# Patient Record
Sex: FEMALE | Race: WHITE | NOT HISPANIC OR LATINO | URBAN - METROPOLITAN AREA
[De-identification: names, ages, dates, MRNs, and addresses within clinical notes are randomized per-mention and may not be internally consistent; named-entity substitution may affect disease eponyms.]

---

## 2018-10-31 ENCOUNTER — NEW PATIENT (OUTPATIENT)
Dept: URBAN - METROPOLITAN AREA CLINIC 30 | Facility: CLINIC | Age: 83
End: 2018-10-31
Payer: MEDICARE

## 2018-10-31 DIAGNOSIS — H04.123 TEAR FILM INSUFFICIENCY OF BILATERAL LACRIMAL GLANDS: ICD-10-CM

## 2018-10-31 DIAGNOSIS — H35.3131 NONEXUDATIVE MACULAR DEGENERATION, EARLY DRY STAGE, BILATERAL: Primary | ICD-10-CM

## 2018-10-31 PROCEDURE — 92015 DETERMINE REFRACTIVE STATE: CPT | Performed by: OPTOMETRIST

## 2018-10-31 PROCEDURE — 92004 COMPRE OPH EXAM NEW PT 1/>: CPT | Performed by: OPTOMETRIST

## 2018-10-31 PROCEDURE — 92134 CPTRZ OPH DX IMG PST SGM RTA: CPT | Performed by: OPTOMETRIST

## 2018-10-31 ASSESSMENT — INTRAOCULAR PRESSURE
OS: 13
OD: 14

## 2018-10-31 ASSESSMENT — KERATOMETRY
OD: 42.73
OS: 42.87

## 2018-10-31 ASSESSMENT — VISUAL ACUITY
OS: 20/30
OD: 20/25

## 2021-09-02 ENCOUNTER — OFFICE VISIT (OUTPATIENT)
Dept: URBAN - METROPOLITAN AREA CLINIC 30 | Facility: CLINIC | Age: 86
End: 2021-09-02
Payer: MEDICARE

## 2021-09-02 DIAGNOSIS — Z96.1 PRESENCE OF INTRAOCULAR LENS: Primary | ICD-10-CM

## 2021-09-02 PROCEDURE — 99214 OFFICE O/P EST MOD 30 MIN: CPT | Performed by: OPTOMETRIST

## 2021-09-02 PROCEDURE — 92134 CPTRZ OPH DX IMG PST SGM RTA: CPT | Performed by: OPTOMETRIST

## 2021-09-02 ASSESSMENT — VISUAL ACUITY
OD: 20/50
OS: 20/350

## 2021-09-02 ASSESSMENT — KERATOMETRY
OS: 42.74
OD: 42.95

## 2021-09-02 ASSESSMENT — INTRAOCULAR PRESSURE
OS: 14
OD: 13

## 2021-09-02 NOTE — IMPRESSION/PLAN
Impression: Exudative macular degeneration, inactive scar, left eye: H35.7423. Plan: Lost to FU. Wet AMD New onset. + HMG OS. Pt ed. Recommend AREDS 2 and AG. Recommend leafy green vegetables in diet. Will refer to retina team for management.

## 2021-09-02 NOTE — IMPRESSION/PLAN
Impression: Nonexudative macular degeneration, intermediate dry stage, right eye: H35.2892. Plan: see other notes.

## 2021-10-04 ENCOUNTER — OFFICE VISIT (OUTPATIENT)
Dept: URBAN - METROPOLITAN AREA CLINIC 30 | Facility: CLINIC | Age: 86
End: 2021-10-04
Payer: MEDICARE

## 2021-10-04 DIAGNOSIS — H35.3223 EXUDATIVE MACULAR DEGENERATION, INACTIVE SCAR, LEFT EYE: Primary | ICD-10-CM

## 2021-10-04 PROCEDURE — 99204 OFFICE O/P NEW MOD 45 MIN: CPT | Performed by: OPHTHALMOLOGY

## 2021-10-04 PROCEDURE — 92134 CPTRZ OPH DX IMG PST SGM RTA: CPT | Performed by: OPHTHALMOLOGY

## 2021-10-04 PROCEDURE — 67028 INJECTION EYE DRUG: CPT | Performed by: OPHTHALMOLOGY

## 2021-10-04 ASSESSMENT — INTRAOCULAR PRESSURE
OD: 10
OS: 10

## 2021-10-04 NOTE — IMPRESSION/PLAN
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  did NOT NOTICE the drop [facility] .  .  SEVERE OS. Hmg and fluid. Duration is UNK. Chronic? LONG d/w pt and family. Options reviewed. Determined TRIAL of injx and POSSIBLE FUTURE BELVEDERE for IMPLANT OS. TODAY Avastin OS (proc note). Future HRA? (less common HRA) RTC 4-6w ND/inj/pos OCT - plan Avastin OS Examination and imaging confirm retinal changes c/w a form of WET macular degeneration. New Avastin injection given for retinal edema / disease activity. All r/b/a reviewed. Off-label use in eye of FDA-approved drug. Theoretical low (but not zero) risk of adverse ocular event or ATE --understood and accepted. Costs / expense / insurance / risk understood -accepted. FUTURE BELVEDERE options?   AGE?

## 2021-11-01 ENCOUNTER — OFFICE VISIT (OUTPATIENT)
Dept: URBAN - METROPOLITAN AREA CLINIC 30 | Facility: CLINIC | Age: 86
End: 2021-11-01
Payer: MEDICARE

## 2021-11-01 PROCEDURE — 67028 INJECTION EYE DRUG: CPT | Performed by: OPHTHALMOLOGY

## 2021-11-01 PROCEDURE — 92134 CPTRZ OPH DX IMG PST SGM RTA: CPT | Performed by: OPHTHALMOLOGY

## 2021-11-01 ASSESSMENT — INTRAOCULAR PRESSURE
OS: 13
OD: 13

## 2021-11-01 NOTE — IMPRESSION/PLAN
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  did NOT NOTICE the drop in 2000 E [facility] .  .  SEVERE OS. Hmg and fluid. Duration is UNK. Chronic? LONG d/w pt and family. Options reviewed. TODAY Avastin OS (proc note). Future HRA? (less common HRA) RTC 4-6w ND2 inj/pos OCT - plan Avastin OS    TRIAL injx POSSIBLE FUTURE BELVEDERE for IMPLANT OS or commercial SUSVIMO

## 2021-11-01 NOTE — IMPRESSION/PLAN
Impression: DRY AMD Right eye: J16.5673. Plan: RIGHT eye DRY AMD --   DRY Macular Degeneration. AREDS2  reviewed  (PRNutr), diet, fish, Amsler, non-smoking.

## 2021-11-29 ENCOUNTER — OFFICE VISIT (OUTPATIENT)
Dept: URBAN - METROPOLITAN AREA CLINIC 30 | Facility: CLINIC | Age: 86
End: 2021-11-29
Payer: MEDICARE

## 2021-11-29 PROCEDURE — 92134 CPTRZ OPH DX IMG PST SGM RTA: CPT | Performed by: OPHTHALMOLOGY

## 2021-11-29 PROCEDURE — 67028 INJECTION EYE DRUG: CPT | Performed by: OPHTHALMOLOGY

## 2021-11-29 ASSESSMENT — INTRAOCULAR PRESSURE
OS: 9
OD: 9

## 2021-11-29 NOTE — IMPRESSION/PLAN
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  did NOT NOTICE the drop in 2000 E [facility] .  .  SEVERE OS. Hmg and fluid. Duration is UNK. Chronic? LONG d/w pt and family. Options reviewed. TODAY Avastin OS (proc note). Future ND1? (less common HRA - ND2?) 
     RTC 4-6w dil, OCT, eval - h/o Avastin OS    TRIAL injx (ATROPHY LIMITING?) POSSIBLE FUTURE PDS IMPLANT OS w commercial SUSVIMO -- however, Atrophy limiting issue -- D/w'd pt and family.      Not study

## 2022-01-14 ENCOUNTER — OFFICE VISIT (OUTPATIENT)
Dept: URBAN - METROPOLITAN AREA CLINIC 24 | Facility: CLINIC | Age: 87
End: 2022-01-14
Payer: MEDICARE

## 2022-01-14 PROCEDURE — 99214 OFFICE O/P EST MOD 30 MIN: CPT | Performed by: OPHTHALMOLOGY

## 2022-01-14 PROCEDURE — 92134 CPTRZ OPH DX IMG PST SGM RTA: CPT | Performed by: OPHTHALMOLOGY

## 2022-01-14 PROCEDURE — 67028 INJECTION EYE DRUG: CPT | Performed by: OPHTHALMOLOGY

## 2022-01-14 ASSESSMENT — INTRAOCULAR PRESSURE
OD: 17
OS: 16

## 2022-01-14 NOTE — IMPRESSION/PLAN
Impression: WET AMD OS CNVM 
 NEW Avastin Infr. OS x Oct '21 Plan: hx: [[LOST to f/u. . . Did NOT NOTICE the drop in [place] w Hmg/CME. UNK duration - chronic? LONG d/w pt and family. Options reviewed]]. TODAY Avastin OS (proc note). Future ND2? (less common HRA - ND2?) 
    RTC 4-6w ND1 injx/OCT/ plan Avastin OS    TRIAL injx (ATROPHY LIMITING?) Do Benefits investigation / POSSIBLE FUTURE PDS IMPLANT OS w commercial SUSVIMO -- however, Atrophy limiting issue -- D/w'd pt and family.      Not study

## 2022-02-21 ENCOUNTER — OFFICE VISIT (OUTPATIENT)
Dept: URBAN - METROPOLITAN AREA CLINIC 30 | Facility: CLINIC | Age: 87
End: 2022-02-21
Payer: MEDICARE

## 2022-02-21 DIAGNOSIS — H35.3112 NONEXUDATIVE MACULAR DEGENERATION, INTERMEDIATE DRY STAGE, RIGHT EYE: ICD-10-CM

## 2022-02-21 DIAGNOSIS — H35.3221 EXUDATIVE AGE-RELATED MACULAR DEGENERATION, LEFT EYE, WITH ACTIVE CHOROIDAL NEOVASCULARIZATION: Primary | ICD-10-CM

## 2022-02-21 PROCEDURE — 92134 CPTRZ OPH DX IMG PST SGM RTA: CPT | Performed by: OPHTHALMOLOGY

## 2022-02-21 PROCEDURE — 67028 INJECTION EYE DRUG: CPT | Performed by: OPHTHALMOLOGY

## 2022-02-21 ASSESSMENT — INTRAOCULAR PRESSURE
OS: 14
OD: 13

## 2022-02-21 NOTE — IMPRESSION/PLAN
Impression: WET AMD OS CNVM 
 NEW Avastin Infr. OS x Oct '21 Plan: hx: [[LOST to f/u. . . Did NOT NOTICE the drop in [place] w Hmg/CME. UNK duration - chronic? LONG d/w pt and family. Options reviewed]]. TODAY Avastin OS (proc note). Future exam/ND2? (less common HRA - ND2?) 
    RTC 4-6w ND2 injx/OCT/ plan INFERIOR SITE Avastin OS    
          TRIAL injx (ATROPHY LIMITING?) Do Benefits investigation / POSSIBLE FUTURE PDS IMPLANT OS w commercial SUSVIMO -- however, Atrophy limiting issue -- D/w'd pt and family.      Not study

## 2022-05-16 ENCOUNTER — OFFICE VISIT (OUTPATIENT)
Dept: URBAN - METROPOLITAN AREA CLINIC 30 | Facility: CLINIC | Age: 87
End: 2022-05-16
Payer: MEDICARE

## 2022-05-16 DIAGNOSIS — H35.3221 EXUDATIVE AGE-RELATED MACULAR DEGENERATION, LEFT EYE, WITH ACTIVE CHOROIDAL NEOVASCULARIZATION: Primary | ICD-10-CM

## 2022-05-16 PROCEDURE — 92134 CPTRZ OPH DX IMG PST SGM RTA: CPT | Performed by: OPHTHALMOLOGY

## 2022-05-16 PROCEDURE — 67028 INJECTION EYE DRUG: CPT | Performed by: OPHTHALMOLOGY

## 2022-05-16 ASSESSMENT — INTRAOCULAR PRESSURE
OD: 9
OS: 9

## 2022-05-16 NOTE — IMPRESSION/PLAN
Impression: WET AMD OS CNVM 
 NEW Avastin Infr. OS x Oct '21 Plan: hx: [[LOST to f/u. . . Did NOT NOTICE the drop in [place] w Hmg/CME. UNK duration - chronic? LONG d/w pt and family. Options reviewed]]. TODAY Avastin OS (proc note). Future exam/ND2? (less common HRA - ND2?) 
    RTC 8-9w ND2 injx/OCT/ plan INFERIOR SITE Avastin OS - EXTEND OK in '22 TRIAL injx (ATROPHY LIMITING?) Do Benefits investigation / POSSIBLE FUTURE PDS IMPLANT OS w commercial SUSVIMO -- however, Atrophy limiting issue -- D/w'd pt and family.      Not study

## 2022-07-11 ENCOUNTER — OFFICE VISIT (OUTPATIENT)
Dept: URBAN - METROPOLITAN AREA CLINIC 30 | Facility: CLINIC | Age: 87
End: 2022-07-11
Payer: MEDICARE

## 2022-07-11 DIAGNOSIS — H35.3112 NONEXUDATIVE MACULAR DEGENERATION, INTERMEDIATE DRY STAGE, RIGHT EYE: ICD-10-CM

## 2022-07-11 DIAGNOSIS — H35.3221 EXUDATIVE AGE-RELATED MACULAR DEGENERATION, LEFT EYE, WITH ACTIVE CHOROIDAL NEOVASCULARIZATION: Primary | ICD-10-CM

## 2022-07-11 PROCEDURE — 99214 OFFICE O/P EST MOD 30 MIN: CPT | Performed by: OPHTHALMOLOGY

## 2022-07-11 PROCEDURE — 92134 CPTRZ OPH DX IMG PST SGM RTA: CPT | Performed by: OPHTHALMOLOGY

## 2022-07-11 PROCEDURE — 67028 INJECTION EYE DRUG: CPT | Performed by: OPHTHALMOLOGY

## 2022-07-11 ASSESSMENT — INTRAOCULAR PRESSURE
OS: 13
OD: 12

## 2022-07-11 NOTE — IMPRESSION/PLAN
Impression: DRY AMD Right eye: E33.4408. Plan: RIGHT eye DRY AMD --   DRY Macular Degen on today's implant AREDS2  reviewed  (PRNutr), diet, fish, Amsler, non-smoking.

## 2022-09-20 ENCOUNTER — ADULT PHYSICAL (OUTPATIENT)
Dept: URBAN - METROPOLITAN AREA CLINIC 30 | Facility: CLINIC | Age: 87
End: 2022-09-20
Payer: MEDICARE

## 2022-09-20 DIAGNOSIS — Z01.818 ENCOUNTER FOR OTHER PREPROCEDURAL EXAMINATION: Primary | ICD-10-CM

## 2022-09-20 DIAGNOSIS — H35.3221 EXUDATIVE AGE-RELATED MACULAR DEGENERATION, LEFT EYE, WITH ACTIVE CHOROIDAL NEOVASCULARIZATION: ICD-10-CM

## 2022-09-20 PROCEDURE — 99204 OFFICE O/P NEW MOD 45 MIN: CPT | Performed by: PHYSICIAN ASSISTANT

## 2022-09-20 RX ORDER — SPIRONOLACTONE 100 MG/1
100 MG TABLET, FILM COATED ORAL
Qty: 0 | Refills: 0 | Status: ACTIVE
Start: 2022-09-20

## 2022-09-29 ENCOUNTER — POST-OPERATIVE VISIT (OUTPATIENT)
Dept: URBAN - METROPOLITAN AREA CLINIC 30 | Facility: CLINIC | Age: 87
End: 2022-09-29
Payer: MEDICARE

## 2022-09-29 DIAGNOSIS — Z48.810 ENCOUNTER FOR SURGICAL AFTERCARE FOLLOWING SURGERY ON A SENSE ORGAN: Primary | ICD-10-CM

## 2022-09-29 PROCEDURE — 99024 POSTOP FOLLOW-UP VISIT: CPT | Performed by: OPTOMETRIST

## 2022-09-29 ASSESSMENT — INTRAOCULAR PRESSURE: OS: 14

## 2022-09-29 NOTE — IMPRESSION/PLAN
Impression:  Encounter for surgical aftercare following surgery on a sense organ  Z48.810. Excellent post op course Plan: Removed mucous discharge c cotton tip applicator. RTC as mirlande.

## 2022-10-12 ENCOUNTER — POST-OPERATIVE VISIT (OUTPATIENT)
Dept: URBAN - METROPOLITAN AREA CLINIC 24 | Facility: CLINIC | Age: 87
End: 2022-10-12
Payer: MEDICARE

## 2022-10-12 DIAGNOSIS — H35.3221 EXUDATIVE AGE-RELATED MACULAR DEGENERATION, LEFT EYE, WITH ACTIVE CHOROIDAL NEOVASCULARIZATION: Primary | ICD-10-CM

## 2022-10-12 PROCEDURE — 99024 POSTOP FOLLOW-UP VISIT: CPT | Performed by: OPHTHALMOLOGY

## 2022-10-12 PROCEDURE — 92134 CPTRZ OPH DX IMG PST SGM RTA: CPT | Performed by: OPHTHALMOLOGY

## 2022-10-12 ASSESSMENT — INTRAOCULAR PRESSURE
OD: 12
OS: 10

## 2022-10-12 NOTE — IMPRESSION/PLAN
Impression: WET AMD OS CNVM 
 NEW Avastin Infr. OS x Oct '21 SUSVIMO PDS implant Sep '22 Plan: hx: [[LOST to f/u. . . Did NOT NOTICE the drop in [place] w Hmg/CME. UNK duration - chronic? LONG d/w pt and family. Options reviewed]]. DONE w VIT / Sallyann Bushy implant Sep '22 in order to REDUCE injx BURDEN. (ATROPHY LIMITING?) TODAY post op IMPROVED, SUSVIMO w NO erosion / retraction surface VA IMPROVED -- NO inj needed/  F/u Gen eye 4w [facility] / [facility] 
    RTC RETINA w COLORS pos / OCT and EXAM -- IF HEALING, EXTEND f/u REFILL /exchng likely APRIL '23 DONE Sep '22 OS PDS IMPLANT OS w commercial SUSVIMO -- however, Atrophy limiting issue -- D/w'd pt and family. Pt and family pleased.

## 2022-12-12 ENCOUNTER — OFFICE VISIT (OUTPATIENT)
Dept: URBAN - METROPOLITAN AREA CLINIC 30 | Facility: CLINIC | Age: 87
End: 2022-12-12
Payer: MEDICARE

## 2022-12-12 DIAGNOSIS — H35.3221 EXDTVE AGE-REL MCLR DEGN, LEFT EYE, WITH ACTV CHRDL NEOVAS: Primary | ICD-10-CM

## 2022-12-12 PROCEDURE — 92134 CPTRZ OPH DX IMG PST SGM RTA: CPT | Performed by: OPHTHALMOLOGY

## 2022-12-12 PROCEDURE — 99024 POSTOP FOLLOW-UP VISIT: CPT | Performed by: OPHTHALMOLOGY

## 2022-12-12 PROCEDURE — 92250 FUNDUS PHOTOGRAPHY W/I&R: CPT | Performed by: OPHTHALMOLOGY

## 2022-12-12 ASSESSMENT — INTRAOCULAR PRESSURE
OD: 9
OS: 10

## 2022-12-12 NOTE — IMPRESSION/PLAN
Impression: WET AMD OS CNVM 
 NEW Avastin Infr. OS x Oct '21 SUSVIMO PDS implant Sep '22 Plan: hx: [[LOST to f/u. . . Did NOT NOTICE the drop in [place] w Hmg/CME. UNK duration - chronic? LONG d/w pt and family. Options reviewed]]. DONE w VIT / Bret Solid implant Sep '22 in order to REDUCE injx BURDEN. (ATROPHY LIMITING?) TODAY  IMPROVED, SUSVIMO w NO erosion / retraction - stable VA IMPROVED -- NO inj needed/  
    RTC RETINA  COLORS pos / OCT and EXAM -- EXTEND f/u
        RTC RETINA 3mo -- IF stable, plan  REFILL /exchng likely APRIL '23 DONE Sep '22 OS PDS IMPLANT OS w commercial SUSVIMO -- however, Atrophy limiting issue -- D/w'd pt and family. Pt and family pleased.